# Patient Record
Sex: FEMALE | URBAN - METROPOLITAN AREA
[De-identification: names, ages, dates, MRNs, and addresses within clinical notes are randomized per-mention and may not be internally consistent; named-entity substitution may affect disease eponyms.]

---

## 2022-09-20 ENCOUNTER — LAB OUTSIDE AN ENCOUNTER (OUTPATIENT)
Dept: URBAN - METROPOLITAN AREA CLINIC 115 | Facility: CLINIC | Age: 67
End: 2022-09-20

## 2022-09-20 ENCOUNTER — OFFICE VISIT (OUTPATIENT)
Dept: URBAN - METROPOLITAN AREA CLINIC 115 | Facility: CLINIC | Age: 67
End: 2022-09-20
Payer: SELF-PAY

## 2022-09-20 VITALS
TEMPERATURE: 98.3 F | HEART RATE: 64 BPM | RESPIRATION RATE: 14 BRPM | WEIGHT: 193.6 LBS | HEIGHT: 62 IN | SYSTOLIC BLOOD PRESSURE: 155 MMHG | DIASTOLIC BLOOD PRESSURE: 74 MMHG | BODY MASS INDEX: 35.63 KG/M2

## 2022-09-20 DIAGNOSIS — R93.2 ABNORMAL CT SCAN, LIVER: ICD-10-CM

## 2022-09-20 DIAGNOSIS — K57.30 DIVERTICULOSIS LARGE INTESTINE W/O PERFORATION OR ABSCESS W/O BLEEDING: ICD-10-CM

## 2022-09-20 DIAGNOSIS — E66.9 OBESITY (BMI 30.0-34.9): ICD-10-CM

## 2022-09-20 DIAGNOSIS — R10.33 PERIUMBILICAL ABDOMINAL PAIN: ICD-10-CM

## 2022-09-20 DIAGNOSIS — R93.3 ABNORMAL COMPUTED TOMOGRAPHY OF LARGE INTESTINE: ICD-10-CM

## 2022-09-20 PROBLEM — 15634181000119107: Status: ACTIVE | Noted: 2022-09-20

## 2022-09-20 PROBLEM — 443503005: Status: ACTIVE | Noted: 2022-09-20

## 2022-09-20 PROBLEM — 443371000124107: Status: ACTIVE | Noted: 2022-09-20

## 2022-09-20 PROBLEM — 724538004: Status: ACTIVE | Noted: 2022-09-20

## 2022-09-20 PROBLEM — 442182001: Status: ACTIVE | Noted: 2022-09-20

## 2022-09-20 PROCEDURE — 99202 OFFICE O/P NEW SF 15 MIN: CPT | Performed by: INTERNAL MEDICINE

## 2022-09-20 RX ORDER — HYDROCHLOROTHIAZIDE 25 MG/1
1 TABLET IN THE MORNING TABLET ORAL ONCE A DAY
Status: ACTIVE | COMMUNITY

## 2022-09-20 RX ORDER — METHIMAZOLE 10 MG/1
1 TABLET TABLET ORAL ONCE A DAY
Status: ACTIVE | COMMUNITY

## 2022-09-20 RX ORDER — LOSARTAN POTASSIUM 100 MG/1
1 TABLET TABLET ORAL ONCE A DAY
Status: ACTIVE | COMMUNITY

## 2022-09-20 NOTE — HPI-TODAY'S VISIT:
67 y/o  female  with medical hx of HTN,obesity,Diverticulosis,fatty liver that came for eval due to chronic periumbilical burning pain and LLQ pain. She went to ER and CT scan shows thicheking of left side colon,diverticulosis and fatty liver 22. She denies GI bleeding or abnormal weight loss.Labs shows stable hgb and normal liver enzymes.No gallstones on GB,normal pancreas. Denies smoking or family hx of CRC

## 2022-09-20 NOTE — PHYSICAL EXAM LYMPHATIC:
----- Message from JOSUÉ Rogers sent at 9/10/2021  8:27 AM CDT -----  Please inform the patient covid test was negative   no palpable adenopathy

## 2022-10-05 ENCOUNTER — CLAIMS CREATED FROM THE CLAIM WINDOW (OUTPATIENT)
Dept: URBAN - METROPOLITAN AREA CLINIC 4 | Facility: CLINIC | Age: 67
End: 2022-10-05
Payer: SELF-PAY

## 2022-10-05 ENCOUNTER — OFFICE VISIT (OUTPATIENT)
Dept: URBAN - METROPOLITAN AREA SURGERY CENTER 13 | Facility: SURGERY CENTER | Age: 67
End: 2022-10-05
Payer: SELF-PAY

## 2022-10-05 ENCOUNTER — TELEPHONE ENCOUNTER (OUTPATIENT)
Dept: URBAN - METROPOLITAN AREA CLINIC 82 | Facility: CLINIC | Age: 67
End: 2022-10-05

## 2022-10-05 DIAGNOSIS — R10.13 ABDOMINAL DISCOMFORT, EPIGASTRIC: ICD-10-CM

## 2022-10-05 DIAGNOSIS — R10.33 ABDOMINAL PAIN, ACUTE, PERIUMBILICAL: ICD-10-CM

## 2022-10-05 DIAGNOSIS — R10.32 ABDOMINAL CRAMPING IN LEFT LOWER QUADRANT: ICD-10-CM

## 2022-10-05 DIAGNOSIS — D12.5 BENIGN NEOPLASM OF SIGMOID COLON: ICD-10-CM

## 2022-10-05 DIAGNOSIS — K29.70 GASTRITIS, UNSPECIFIED, WITHOUT BLEEDING: ICD-10-CM

## 2022-10-05 DIAGNOSIS — D12.5 ADENOMA OF SIGMOID COLON: ICD-10-CM

## 2022-10-05 DIAGNOSIS — R93.3 ABN FINDINGS-GI TRACT: ICD-10-CM

## 2022-10-05 DIAGNOSIS — K31.89 ACQUIRED DEFORMITY OF DUODENUM: ICD-10-CM

## 2022-10-05 PROBLEM — 59621000: Status: ACTIVE | Noted: 2022-10-05

## 2022-10-05 PROBLEM — 13200003: Status: ACTIVE | Noted: 2022-10-05

## 2022-10-05 PROCEDURE — 88312 SPECIAL STAINS GROUP 1: CPT | Performed by: PATHOLOGY

## 2022-10-05 PROCEDURE — 88305 TISSUE EXAM BY PATHOLOGIST: CPT | Performed by: PATHOLOGY

## 2022-10-05 PROCEDURE — 45385 COLONOSCOPY W/LESION REMOVAL: CPT | Performed by: INTERNAL MEDICINE

## 2022-10-05 PROCEDURE — G8907 PT DOC NO EVENTS ON DISCHARG: HCPCS | Performed by: INTERNAL MEDICINE

## 2022-10-05 PROCEDURE — 43239 EGD BIOPSY SINGLE/MULTIPLE: CPT | Performed by: INTERNAL MEDICINE

## 2022-10-05 RX ORDER — LOSARTAN POTASSIUM 100 MG/1
1 TABLET TABLET ORAL ONCE A DAY
Qty: 10 TABLET | Refills: 0 | OUTPATIENT

## 2022-10-05 RX ORDER — METHIMAZOLE 10 MG/1
1 TABLET TABLET ORAL ONCE A DAY
Status: ACTIVE | COMMUNITY

## 2022-10-05 RX ORDER — LOSARTAN POTASSIUM 100 MG/1
1 TABLET TABLET ORAL ONCE A DAY
Status: ACTIVE | COMMUNITY

## 2022-10-05 RX ORDER — HYDROCHLOROTHIAZIDE 25 MG/1
1 TABLET IN THE MORNING TABLET ORAL ONCE A DAY
Qty: 10 TABLET | Refills: 0 | OUTPATIENT

## 2022-10-05 RX ORDER — HYDROCHLOROTHIAZIDE 25 MG/1
1 TABLET IN THE MORNING TABLET ORAL ONCE A DAY
Status: ACTIVE | COMMUNITY

## 2022-10-05 RX ORDER — OMEPRAZOLE 20 MG/1
1 CAPSULE CAPSULE, DELAYED RELEASE ORAL
Qty: 60 | Refills: 0 | OUTPATIENT
Start: 2022-10-05

## 2022-11-01 ENCOUNTER — OFFICE VISIT (OUTPATIENT)
Dept: URBAN - METROPOLITAN AREA CLINIC 115 | Facility: CLINIC | Age: 67
End: 2022-11-01
Payer: SELF-PAY

## 2022-11-01 ENCOUNTER — DASHBOARD ENCOUNTERS (OUTPATIENT)
Age: 67
End: 2022-11-01

## 2022-11-01 VITALS
DIASTOLIC BLOOD PRESSURE: 68 MMHG | BODY MASS INDEX: 35.3 KG/M2 | SYSTOLIC BLOOD PRESSURE: 122 MMHG | WEIGHT: 191.8 LBS | TEMPERATURE: 97.3 F | HEIGHT: 62 IN | RESPIRATION RATE: 14 BRPM | HEART RATE: 74 BPM

## 2022-11-01 DIAGNOSIS — D12.6 TUBULAR ADENOMA OF COLON: ICD-10-CM

## 2022-11-01 DIAGNOSIS — K58.9 IRRITABLE BOWEL SYNDROME, UNSPECIFIED TYPE: ICD-10-CM

## 2022-11-01 DIAGNOSIS — E66.9 OBESITY (BMI 30.0-34.9): ICD-10-CM

## 2022-11-01 DIAGNOSIS — K27.9 PUD (PEPTIC ULCER DISEASE): ICD-10-CM

## 2022-11-01 DIAGNOSIS — K57.30 DIVERTICULOSIS LARGE INTESTINE W/O PERFORATION OR ABSCESS W/O BLEEDING: ICD-10-CM

## 2022-11-01 PROBLEM — 10743008: Status: ACTIVE | Noted: 2022-11-01

## 2022-11-01 PROBLEM — 444898006: Status: ACTIVE | Noted: 2022-11-01

## 2022-11-01 PROCEDURE — 99213 OFFICE O/P EST LOW 20 MIN: CPT | Performed by: INTERNAL MEDICINE

## 2022-11-01 RX ORDER — DICYCLOMINE HYDROCHLORIDE 20 MG/1
1 TABLET TABLET ORAL THREE TIMES A DAY
Qty: 90 | Refills: 1 | OUTPATIENT

## 2022-11-01 RX ORDER — METHIMAZOLE 10 MG/1
1 TABLET TABLET ORAL ONCE A DAY
Status: ACTIVE | COMMUNITY

## 2022-11-01 RX ORDER — OMEPRAZOLE 20 MG/1
1 CAPSULE CAPSULE, DELAYED RELEASE ORAL
Qty: 60 | Refills: 0 | Status: ACTIVE | COMMUNITY
Start: 2022-10-05

## 2022-11-01 RX ORDER — HYDROCHLOROTHIAZIDE 25 MG/1
1 TABLET IN THE MORNING TABLET ORAL ONCE A DAY
Qty: 10 TABLET | Refills: 0 | Status: ACTIVE | COMMUNITY

## 2022-11-01 RX ORDER — OMEPRAZOLE 20 MG/1
1 CAPSULE CAPSULE, DELAYED RELEASE ORAL
Qty: 60 | Refills: 1 | OUTPATIENT

## 2022-11-01 RX ORDER — LOSARTAN POTASSIUM 100 MG/1
1 TABLET TABLET ORAL ONCE A DAY
Qty: 10 TABLET | Refills: 0 | Status: ACTIVE | COMMUNITY

## 2022-11-01 NOTE — HPI-TODAY'S VISIT:
EGD with PUD, she complete 4 weeks of PPI 20mg BID,no h pylori on biopsies COlonoscopy with diverticulosis and 1 TA small.repeat in 5-7 years.External hemorrhoids-small

## 2022-11-11 LAB — HELICOBACTER PYLORI AG, EIA, STOOL: (no result)
